# Patient Record
Sex: MALE | Race: WHITE
[De-identification: names, ages, dates, MRNs, and addresses within clinical notes are randomized per-mention and may not be internally consistent; named-entity substitution may affect disease eponyms.]

---
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
## 2023-08-20 ENCOUNTER — HOSPITAL ENCOUNTER (EMERGENCY)
Dept: HOSPITAL 53 - M ED | Age: 18
Discharge: HOME | End: 2023-08-20
Payer: COMMERCIAL

## 2023-08-20 VITALS — TEMPERATURE: 97.8 F | OXYGEN SATURATION: 98 % | SYSTOLIC BLOOD PRESSURE: 155 MMHG | DIASTOLIC BLOOD PRESSURE: 99 MMHG

## 2023-08-20 VITALS — HEIGHT: 74 IN | BODY MASS INDEX: 24.53 KG/M2 | WEIGHT: 191.14 LBS

## 2023-08-20 DIAGNOSIS — Z79.1: ICD-10-CM

## 2023-08-20 DIAGNOSIS — M54.6: Primary | ICD-10-CM

## 2023-08-20 DIAGNOSIS — Z79.891: ICD-10-CM

## 2023-08-20 DIAGNOSIS — F41.9: ICD-10-CM
